# Patient Record
Sex: MALE | Race: WHITE | NOT HISPANIC OR LATINO | URBAN - METROPOLITAN AREA
[De-identification: names, ages, dates, MRNs, and addresses within clinical notes are randomized per-mention and may not be internally consistent; named-entity substitution may affect disease eponyms.]

---

## 2019-02-04 ENCOUNTER — EMERGENCY (EMERGENCY)
Facility: HOSPITAL | Age: 39
LOS: 0 days | Discharge: HOME | End: 2019-02-04
Attending: EMERGENCY MEDICINE | Admitting: EMERGENCY MEDICINE
Payer: COMMERCIAL

## 2019-02-04 VITALS
TEMPERATURE: 98 F | DIASTOLIC BLOOD PRESSURE: 75 MMHG | OXYGEN SATURATION: 100 % | SYSTOLIC BLOOD PRESSURE: 110 MMHG | HEART RATE: 77 BPM | RESPIRATION RATE: 17 BRPM

## 2019-02-04 VITALS
HEIGHT: 70 IN | WEIGHT: 205.03 LBS | TEMPERATURE: 98 F | RESPIRATION RATE: 16 BRPM | SYSTOLIC BLOOD PRESSURE: 122 MMHG | HEART RATE: 85 BPM | DIASTOLIC BLOOD PRESSURE: 82 MMHG | OXYGEN SATURATION: 100 %

## 2019-02-04 DIAGNOSIS — S92.422A DISPLACED FRACTURE OF DISTAL PHALANX OF LEFT GREAT TOE, INITIAL ENCOUNTER FOR CLOSED FRACTURE: ICD-10-CM

## 2019-02-04 DIAGNOSIS — S90.412A ABRASION, LEFT GREAT TOE, INITIAL ENCOUNTER: ICD-10-CM
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
## 2019-02-04 DIAGNOSIS — Z88.0 ALLERGY STATUS TO PENICILLIN: ICD-10-CM

## 2019-02-04 DIAGNOSIS — Z88.5 ALLERGY STATUS TO NARCOTIC AGENT: ICD-10-CM

## 2019-02-04 DIAGNOSIS — E11.9 TYPE 2 DIABETES MELLITUS WITHOUT COMPLICATIONS: ICD-10-CM

## 2019-02-04 DIAGNOSIS — M79.672 PAIN IN LEFT FOOT: ICD-10-CM
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
## 2019-02-04 PROCEDURE — 99283 EMERGENCY DEPT VISIT LOW MDM: CPT | Mod: 25

## 2019-02-04 PROCEDURE — 73630 X-RAY EXAM OF FOOT: CPT | Mod: 26,LT

## 2019-02-04 PROCEDURE — 73630 X-RAY EXAM OF FOOT: CPT | Mod: 26,LT,77,76

## 2019-02-04 RX ORDER — IBUPROFEN 200 MG
600 TABLET ORAL ONCE
Qty: 0 | Refills: 0 | Status: COMPLETED | OUTPATIENT
Start: 2019-02-04 | End: 2019-02-04

## 2019-02-04 RX ORDER — TETANUS TOXOID, REDUCED DIPHTHERIA TOXOID AND ACELLULAR PERTUSSIS VACCINE, ADSORBED 5; 2.5; 8; 8; 2.5 [IU]/.5ML; [IU]/.5ML; UG/.5ML; UG/.5ML; UG/.5ML
0.5 SUSPENSION INTRAMUSCULAR ONCE
Qty: 0 | Refills: 0 | Status: COMPLETED | OUTPATIENT
Start: 2019-02-04 | End: 2019-02-04

## 2019-02-04 RX ADMIN — TETANUS TOXOID, REDUCED DIPHTHERIA TOXOID AND ACELLULAR PERTUSSIS VACCINE, ADSORBED 0.5 MILLILITER(S): 5; 2.5; 8; 8; 2.5 SUSPENSION INTRAMUSCULAR at 06:16

## 2019-02-04 RX ADMIN — Medication 600 MILLIGRAM(S): at 06:18

## 2019-02-04 NOTE — ED PROVIDER NOTE - OBJECTIVE STATEMENT
39 yo male with h/o DM, foot drop, neuropathy, slipped on outside steps that were icy at home.  Was barefoot.  C/o pain to the left foot, top of the foot and great toe.  +abrasions.  Last tetanus unknown.  Scraped the right 5th toe but no pain.  No other injuries.

## 2019-02-04 NOTE — ED PROVIDER NOTE - PROGRESS NOTE DETAILS
d/w Podiatry resident will see patient in ED AS:  case signed over to Dr Morton pending podiatry evaluation CC:  Podiatry consult appreciated, + cmr achieved & pt cleared for D/C by Podiatry for office f/u in 1 week.

## 2019-02-04 NOTE — ED ADULT NURSE NOTE - CAS EDN DISCHARGE ASSESSMENT
Dressing clean and dry/Awake/Symptoms improved/Patient baseline mental status/Alert and oriented to person, place and time

## 2019-02-04 NOTE — ED ADULT NURSE NOTE - CHPI ED NUR SYMPTOMS NEG
no decreased eating/drinking/no dizziness/no tingling/no weakness/no nausea/no vomiting/no fever/no chills

## 2019-02-04 NOTE — ED ADULT NURSE NOTE - OBJECTIVE STATEMENT
Pt presents to after a mechanical fall and hit his R toe, as per patient he did not hit his head, denies shortness of breath chest pain

## 2019-02-04 NOTE — ED PROVIDER NOTE - SKIN, MLM
Skin normal color for race, warm, dry; abrasion left foot over first second toes and top of foot; abrasion right 5th toe

## 2019-02-04 NOTE — ED ADULT TRIAGE NOTE - CHIEF COMPLAINT QUOTE
Pt presents to ER s/p mechanical slip and fall c/o right toe/foot pain and left pinky toe pain. Fall occurred 30 minutes PTA, denies hitting head.

## 2019-02-04 NOTE — ED PROVIDER NOTE - MUSCULOSKELETAL, MLM
Right foot nontender, FROM.  Left  great toe, o/w nontender.  Ankle, leg nontender. DP and PT 2+ b/l

## 2021-05-12 ENCOUNTER — TRANSCRIPTION ENCOUNTER (OUTPATIENT)
Age: 41
End: 2021-05-12

## 2021-08-31 NOTE — ED ADULT NURSE NOTE - DOES PATIENT HAVE ADVANCE DIRECTIVE
Keep the incision clean and dry. Do not put any lotions or ointments or hydrogen peroxide on the incision. You can shower and allow water to run over the incision, but do not submerge it -- no pools, bathtubs, etc. Do not scrub the incision. Pat it dry with a clean towel.    
No

## 2021-12-02 NOTE — ED PROVIDER NOTE - NSTIMEPROVIDERCAREINITIATE_GEN_ER
CT RN Call 1 (Enrollment Call)    Situation: Patient triggered for high risk readmission. Risk for Unplanned Readmission Score at Discharge is: 21% Pancreatitis   Patient is enrolled in High Risk Transitions in Care program.    Background: [place]  11/24-11/27 Pancreatitis 21% (29%)   hx: A/D, ETOH abuse, Liver disease, ADD  DC home, lives alone    PER DC SUMMARY:   Primary Diagnosis/Hospital Course:   Abdominal pain/recurrent pancreatitis  -secondary to acute on chronic pancreatitis  -CT showed moderate prepancreatic fat stranding/free fluid, increased compared to the prior CT, compatible with acute interstitial edematous pancreatitis  -initial lipase was 873, improved to 777 the following day  -he was treated in the usual fashion with IVFs,  NPO status and eventually a Dilaudid PCA   -? cause of current flare of pancreatis was unclear as the patient stated that he had not ingested any alcohol since his last hospital discharge on 11/7/21  --triglyceride level was normal at 67 yesterday  -I did inform him that marijuana can also cause pancreatitis ; Dr. Donohue consulted and said energy drinks or his new psychiatric medications could possbly contributing factors (I did a little research on the day of discharge, and did not see pancreatitis as a side effect of any of his psychiatric medications)  -he has a history of necrotizing pancreatitis and required an enteral feeding tube during a previous admission.  -when pain improved (rated as a 1-2/1- on the day of discharge), he was started on a clear liquid diet which was advanced as tolerated.  -he will follow up with Dr. Donohue after discharge given his recurrent bouts of pancreatitis, previously felt to be related to alcohol but before this episode he was sober for over two weeks.    Hypomagnesemia  -magnesum level noted to be 1.5 on admission, corrected status post supplementation   Leukocytosis  -WBC count 23.5 upon presentation, normalized on the day of discharge to  6.1K  -suspect secondary to pancreatitis  -no necrosis noted on CT   History of alcohol abuse  -he has not been drinking for >2 weeks   -alcohol level was undetectable upon admission   Anxiety/depression  -psychiatric consult contemplated, but patient is already established with a psychiatrist at [facility] who follows him closely (every 2 weeks).  Also, he tells me that he has been tried on five different medication regimens to date and it does not make much sense to consult with Psychiatry as an inpatient as no urgent needs have been identified (he is not suicidal).   -patient in agreement and will continue following up closely at [facility].     Tobacco/marijuana dependence  -he was placed on a Nicotine patch here which controlled his cravings.   -did also discuss that marijuana can be a cause of pancreatitis and he should quit; also discussed smoking cessation briefly yesterday but trying to eliminate all of his bad habits all at once not realistic.    Initial difficulty urinating/elevated post-void yesterday of 328  -likely related to narcotics  -a follow up post void improved to 18 ml.         Assessment:   The patient states he is not having any further abdominal pain. He denies fever, cough or chills. He is independent with all ADLs. His appetite is good and he is having regular bowel movements without any GI upset. He has no medication questions at this time, but is waiting to discuss future appts and medications with his psychologist.     - Primary Caregiver: self  - Assessment Completed with: patient  - Patient gave permission to discuss with psych MD if unable for the patient to reach. He called his Psychologist today and had to leave a message. He states his current medications are not helping him and he is awaiting a return call.  - Patient has their AVS:yes  - AVS was reviewed with the patient:  yes  - Since discharge, patient is feeling OK  - Activity: stayed the same  - The patient  is taking all medications as prescribed.  AVS medications and  instructions were reviewed with the patient.   - Validate that any new medications were picked up:  yes  - Medication Review completed in Epic  yes  - The patient did not have questions or concerns regarding the medications prescribed.  - The patient is not having pain at this time.  - The patient's appetite is Good  - Patient is having bowel movements.  - Follow up care:  Upcoming appointments TBD   - Safety Concerns:  - Assisted Patient in making the following appointments n/a  - Patient has a TCM Visit on TBD  - Patient verbalized understanding of appointment date/time and will be able to attend the appointment.  - How will the patient get to the appointment? patient will drive.  - Reviewed appropriate contacts for questions / concerns -directed patient to call CT RN as first stop unless life threating situation  - Referrals Made:    - Recommendation: Will follow-up with patient next week.   - Patient's preferred time of day to call n/a.  - PCP notified of enrollment n/a       04-Feb-2019 05:38

## 2025-02-11 ENCOUNTER — APPOINTMENT (OUTPATIENT)
Dept: NEUROLOGY | Facility: CLINIC | Age: 45
End: 2025-02-11

## 2025-07-30 ENCOUNTER — NON-APPOINTMENT (OUTPATIENT)
Age: 45
End: 2025-07-30

## 2025-07-30 ENCOUNTER — APPOINTMENT (OUTPATIENT)
Dept: GASTROENTEROLOGY | Facility: CLINIC | Age: 45
End: 2025-07-30
Payer: COMMERCIAL

## 2025-07-30 VITALS
SYSTOLIC BLOOD PRESSURE: 126 MMHG | DIASTOLIC BLOOD PRESSURE: 78 MMHG | BODY MASS INDEX: 30.06 KG/M2 | WEIGHT: 210 LBS | HEIGHT: 70 IN

## 2025-07-30 DIAGNOSIS — Z71.9 COUNSELING, UNSPECIFIED: ICD-10-CM

## 2025-07-30 DIAGNOSIS — Z12.11 ENCOUNTER FOR SCREENING FOR MALIGNANT NEOPLASM OF COLON: ICD-10-CM

## 2025-07-30 DIAGNOSIS — K86.89 OTHER SPECIFIED DISEASES OF PANCREAS: ICD-10-CM

## 2025-07-30 PROCEDURE — 99204 OFFICE O/P NEW MOD 45 MIN: CPT

## 2025-07-30 RX ORDER — PANCRELIPASE 36000; 180000; 114000 [USP'U]/1; [USP'U]/1; [USP'U]/1
36000-114000 CAPSULE, DELAYED RELEASE PELLETS ORAL 3 TIMES DAILY
Qty: 270 | Refills: 1 | Status: ACTIVE | COMMUNITY
Start: 2025-07-30 | End: 1900-01-01

## 2025-07-30 RX ORDER — PANCRELIPASE 36000; 180000; 114000 [USP'U]/1; [USP'U]/1; [USP'U]/1
CAPSULE, DELAYED RELEASE PELLETS ORAL
Refills: 0 | Status: ACTIVE | COMMUNITY

## 2025-07-30 RX ORDER — INSULIN ASPART 100 [IU]/ML
INJECTION, SOLUTION INTRAVENOUS; SUBCUTANEOUS
Refills: 0 | Status: ACTIVE | COMMUNITY

## 2025-07-30 RX ORDER — SODIUM PICOSULFATE, MAGNESIUM OXIDE, AND ANHYDROUS CITRIC ACID 12; 3.5; 1 G/175ML; G/175ML; MG/175ML
10-3.5-12 MG-GM LIQUID ORAL
Qty: 1 | Refills: 0 | Status: ACTIVE | COMMUNITY
Start: 2025-07-30 | End: 1900-01-01

## 2025-07-30 RX ORDER — PANCRELIPASE LIPASE, PANCRELIPASE PROTEASE, PANCRELIPASE AMYLASE 40000; 126000; 168000 [USP'U]/1; [USP'U]/1; [USP'U]/1
40000-126000 CAPSULE, DELAYED RELEASE ORAL
Qty: 990 | Refills: 0 | Status: ACTIVE | COMMUNITY
Start: 2025-07-30 | End: 1900-01-01